# Patient Record
Sex: FEMALE | Race: BLACK OR AFRICAN AMERICAN | Employment: OTHER | URBAN - METROPOLITAN AREA
[De-identification: names, ages, dates, MRNs, and addresses within clinical notes are randomized per-mention and may not be internally consistent; named-entity substitution may affect disease eponyms.]

---

## 2023-10-09 ENCOUNTER — APPOINTMENT (OUTPATIENT)
Dept: GENERAL RADIOLOGY | Age: 68
End: 2023-10-09
Payer: MEDICARE

## 2023-10-09 ENCOUNTER — HOSPITAL ENCOUNTER (EMERGENCY)
Age: 68
Discharge: HOME | End: 2023-10-09
Payer: MEDICARE

## 2023-10-09 VITALS
HEIGHT: 66 IN | HEART RATE: 86 BPM | BODY MASS INDEX: 44.84 KG/M2 | RESPIRATION RATE: 16 BRPM | DIASTOLIC BLOOD PRESSURE: 88 MMHG | SYSTOLIC BLOOD PRESSURE: 174 MMHG | WEIGHT: 279 LBS | TEMPERATURE: 99 F | OXYGEN SATURATION: 98 %

## 2023-10-09 DIAGNOSIS — S80.211A KNEE ABRASION, RIGHT, INITIAL ENCOUNTER: Primary | ICD-10-CM

## 2023-10-09 DIAGNOSIS — M25.561 ACUTE PAIN OF RIGHT KNEE: ICD-10-CM
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
## 2023-10-09 PROCEDURE — 6360000002 HC RX W HCPCS: Performed by: NURSE PRACTITIONER

## 2023-10-09 PROCEDURE — 90471 IMMUNIZATION ADMIN: CPT | Performed by: NURSE PRACTITIONER

## 2023-10-09 PROCEDURE — 73562 X-RAY EXAM OF KNEE 3: CPT

## 2023-10-09 PROCEDURE — 99284 EMERGENCY DEPT VISIT MOD MDM: CPT

## 2023-10-09 PROCEDURE — 90714 TD VACC NO PRESV 7 YRS+ IM: CPT | Performed by: NURSE PRACTITIONER

## 2023-10-09 RX ADMIN — CLOSTRIDIUM TETANI TOXOID ANTIGEN (FORMALDEHYDE INACTIVATED) AND CORYNEBACTERIUM DIPHTHERIAE TOXOID ANTIGEN (FORMALDEHYDE INACTIVATED) 0.5 ML: 5; 2 INJECTION, SUSPENSION INTRAMUSCULAR at 14:19

## 2023-10-09 ASSESSMENT — PAIN SCALES - GENERAL: PAINLEVEL_OUTOF10: 7

## 2023-10-09 ASSESSMENT — PAIN DESCRIPTION - ORIENTATION: ORIENTATION: LEFT

## 2023-10-09 ASSESSMENT — PAIN - FUNCTIONAL ASSESSMENT: PAIN_FUNCTIONAL_ASSESSMENT: 0-10

## 2023-10-09 ASSESSMENT — PAIN DESCRIPTION - LOCATION: LOCATION: KNEE

## 2023-10-09 NOTE — ED NOTES
Pt states that she fell yesterday down one step and injured her right knee.   Having some increased difficulty with ambulation     Ketty Solano RN  10/09/23 9134

## 2023-10-09 NOTE — ED TRIAGE NOTES
Pt ambulatory to triage with cane with spouse. Pt reports left knee pain and skin tear after falling yesterday. Pt state she tripped and fell down one stair. Pt denies hitting her head, denies loc. Left knee skin tear covered with bandage. Bleeding controlled at present time.

## 2023-10-09 NOTE — ED NOTES
Left knee wound cleaned and a dsg with neosporin applied     Alissa Cheema, GAUDENCIO  10/09/23 4028

## 2023-10-09 NOTE — ED PROVIDER NOTES
process. Patient is well-hydrated appearing, no distress. Nontoxic-appearing, tolerating oral intake, hemodynamically stable. All findings and plan were discussed with the patient. All questions answered. Discussed with the patient that an unremarkable evaluation in the ED does not preclude the development or presence of a serious or life threatening condition. Patient was instructed to return immediately for any worsening or change in current symptoms, or if symptoms do not continue to improve. I instructed them to follow up with their primary care provider, own specialist, or medical provider that I am recommending for him within the next 2-3 days  The patient acknowledged understanding plan of care and affirmed approval.     Signed by: MAYDA Alfaro     This note created using Dragon voice recognition software. Please excuse any accidental errors associated with its use, as note has not been fully proofread and edited. Risk of Complications and/or Morbidity of Patient Management:  Patient was discharged risks and benefits of hospitalization were considered. Shared medical decision making was utilized in creating the patients health plan today. History       HPI  Pleasant 81-year-old female presents to the ED with complaint of right knee pain and abrasion. States yesterday afternoon she was walking down steps, tripped and fell from a height of 1 step onto the right knee. She received a skin tear that she states continues to use. She has been weightbearing and ambulatory no difficulty. She states she has had no other falls. No dizziness or lightheadedness. No numbness tingling weakness, no headache, neck pain, back pain or other complaints. Unknown last tetanus booster. Pleasant, well-appearing, appears no distress.   Review of Systems  As in HPI  Physical Exam     Vitals signs and nursing note reviewed:  Vitals:    10/09/23 1334 10/09/23 1336   BP: (!) 166/102 (!) 174/88

## 2025-04-13 ENCOUNTER — HOSPITAL ENCOUNTER (EMERGENCY)
Age: 70
Discharge: HOME | End: 2025-04-13
Payer: MEDICARE

## 2025-04-13 VITALS
TEMPERATURE: 97.9 F | SYSTOLIC BLOOD PRESSURE: 158 MMHG | RESPIRATION RATE: 17 BRPM | HEIGHT: 64 IN | OXYGEN SATURATION: 97 % | HEART RATE: 81 BPM | WEIGHT: 275 LBS | DIASTOLIC BLOOD PRESSURE: 74 MMHG | BODY MASS INDEX: 46.95 KG/M2

## 2025-04-13 DIAGNOSIS — S16.1XXA ACUTE STRAIN OF NECK MUSCLE, INITIAL ENCOUNTER: Primary | ICD-10-CM

## 2025-04-13 PROCEDURE — 99284 EMERGENCY DEPT VISIT MOD MDM: CPT

## 2025-04-13 PROCEDURE — 6360000002 HC RX W HCPCS

## 2025-04-13 PROCEDURE — 6370000000 HC RX 637 (ALT 250 FOR IP)

## 2025-04-13 PROCEDURE — 96372 THER/PROPH/DIAG INJ SC/IM: CPT

## 2025-04-13 RX ORDER — LIDOCAINE 50 MG/G
1 PATCH TOPICAL DAILY
Qty: 30 PATCH | Refills: 0 | Status: SHIPPED | OUTPATIENT
Start: 2025-04-13 | End: 2025-05-13

## 2025-04-13 RX ORDER — NAPROXEN 500 MG/1
500 TABLET ORAL 2 TIMES DAILY WITH MEALS
Qty: 14 TABLET | Refills: 0 | Status: SHIPPED | OUTPATIENT
Start: 2025-04-13 | End: 2025-04-20

## 2025-04-13 RX ORDER — CYCLOBENZAPRINE HCL 10 MG
10 TABLET ORAL
Status: COMPLETED | OUTPATIENT
Start: 2025-04-13 | End: 2025-04-13

## 2025-04-13 RX ORDER — METHOCARBAMOL 750 MG/1
750 TABLET, FILM COATED ORAL 3 TIMES DAILY
Qty: 21 TABLET | Refills: 0 | Status: SHIPPED | OUTPATIENT
Start: 2025-04-13 | End: 2025-04-20

## 2025-04-13 RX ORDER — LIDOCAINE 4 G/G
1 PATCH TOPICAL
Status: DISCONTINUED | OUTPATIENT
Start: 2025-04-13 | End: 2025-04-13 | Stop reason: HOSPADM

## 2025-04-13 RX ORDER — KETOROLAC TROMETHAMINE 30 MG/ML
30 INJECTION, SOLUTION INTRAMUSCULAR; INTRAVENOUS
Status: COMPLETED | OUTPATIENT
Start: 2025-04-13 | End: 2025-04-13

## 2025-04-13 RX ADMIN — CYCLOBENZAPRINE HYDROCHLORIDE 10 MG: 10 TABLET, FILM COATED ORAL at 12:33

## 2025-04-13 RX ADMIN — KETOROLAC TROMETHAMINE 30 MG: 30 INJECTION, SOLUTION INTRAMUSCULAR at 12:33

## 2025-04-13 ASSESSMENT — PAIN - FUNCTIONAL ASSESSMENT: PAIN_FUNCTIONAL_ASSESSMENT: 0-10

## 2025-04-13 ASSESSMENT — PAIN DESCRIPTION - LOCATION: LOCATION: NECK

## 2025-04-13 ASSESSMENT — PAIN SCALES - GENERAL
PAINLEVEL_OUTOF10: 9
PAINLEVEL_OUTOF10: 5
PAINLEVEL_OUTOF10: 9

## 2025-04-13 NOTE — ED TRIAGE NOTES
Pt ambulatory to triage for reports of bilateral neck pain. Pt states pain started Friday & has persisted. Pt states she has had prior surgery & states she is worried she may have irritated it when helping her  get up.

## 2025-04-13 NOTE — DISCHARGE INSTRUCTIONS
As we discussed, your examination is consistent with cervical strain.  I will send home with some muscle relaxants, anti-inflammatory pain medications as well as lidocaine patches to place over affected areas of pain.  I have also given you some information in your discharge paperwork in regards to home care which you can perform to improve your symptoms as well.  You may follow-up with your primary care doctor.  I placed information down below if you do not have 1 established.  As we discussed, please return to the emergency department for any new, worsening, concerning symptoms.    We would love to help you get a primary care doctor for follow-up after your emergency department visit.    Please call [phone] between 7AM - 6PM Monday to Friday.  A care navigator will be able to assist you with setting up a doctor close to your home.

## 2025-04-13 NOTE — ED PROVIDER NOTES
Emergency Department Provider Note       PCP: No primary care provider on file.   Age: 69 y.o.   Sex: female     DISPOSITION Decision To Discharge 04/13/2025 01:29:55 PM    ICD-10-CM    1. Acute strain of neck muscle, initial encounter  S16.1XXA           Medical Decision Making     In summary, this is a well-appearing nontoxic 69-year-old female coming to the emergency department for bilateral paraspinal musculature tenderness ongoing for the past few days.  Likely secondary from assisting her  at home.  She did not have any midline tenderness I did not feel any imaging was needed.  She had significant improvement of pain here especially with lidocaine patch.  I will discharge patient with a course of Robaxin, naproxen, lidocaine patches.  I have given instruction in discharge paperwork in regards to home care which she can perform to improve her symptoms as well.  Recommended follow-up with her primary care although she does not have 1 listed she has 1 established per her report in 1 to 2 weeks for symptom recheck.  Findings here today and plan moving forward discussed at length with patient in which she is in agreement with.  Very strict return precautions were discussed which she verbalized understanding.  ED Course as of 04/13/25 1331   Sun Apr 13, 2025   1328 Patient with significant proved pain.  Will discharge [TC]      ED Course User Index  [TC] Venancio Navas, BRANDON - NP     1 acute, uncomplicated illness or injury.  Prescription drug management performed.  Patient was discharged risks and benefits of hospitalization were considered.  Shared medical decision making was utilized in creating the patients health plan today.  I independently ordered and reviewed each unique test.    I reviewed external records: ED visit note from a different ED.   I reviewed external records: provider visit note from PCP.  I reviewed external records: provider visit note from outside specialist.  I reviewed external